# Patient Record
Sex: FEMALE | Race: WHITE | NOT HISPANIC OR LATINO | URBAN - METROPOLITAN AREA
[De-identification: names, ages, dates, MRNs, and addresses within clinical notes are randomized per-mention and may not be internally consistent; named-entity substitution may affect disease eponyms.]

---

## 2018-03-13 ENCOUNTER — APPOINTMENT (RX ONLY)
Dept: URBAN - METROPOLITAN AREA CLINIC 127 | Facility: CLINIC | Age: 83
Setting detail: DERMATOLOGY
End: 2018-03-13

## 2018-03-13 DIAGNOSIS — Z71.89 OTHER SPECIFIED COUNSELING: ICD-10-CM

## 2018-03-13 DIAGNOSIS — L82.1 OTHER SEBORRHEIC KERATOSIS: ICD-10-CM

## 2018-03-13 DIAGNOSIS — L81.4 OTHER MELANIN HYPERPIGMENTATION: ICD-10-CM

## 2018-03-13 DIAGNOSIS — L24.9 IRRITANT CONTACT DERMATITIS, UNSPECIFIED CAUSE: ICD-10-CM | Status: RESOLVED

## 2018-03-13 PROBLEM — J45.909 UNSPECIFIED ASTHMA, UNCOMPLICATED: Status: ACTIVE | Noted: 2018-03-13

## 2018-03-13 PROBLEM — Z85.828 PERSONAL HISTORY OF OTHER MALIGNANT NEOPLASM OF SKIN: Status: ACTIVE | Noted: 2018-03-13

## 2018-03-13 PROBLEM — J30.1 ALLERGIC RHINITIS DUE TO POLLEN: Status: ACTIVE | Noted: 2018-03-13

## 2018-03-13 PROBLEM — M12.9 ARTHROPATHY, UNSPECIFIED: Status: ACTIVE | Noted: 2018-03-13

## 2018-03-13 PROBLEM — K21.9 GASTRO-ESOPHAGEAL REFLUX DISEASE WITHOUT ESOPHAGITIS: Status: ACTIVE | Noted: 2018-03-13

## 2018-03-13 PROBLEM — F32.9 MAJOR DEPRESSIVE DISORDER, SINGLE EPISODE, UNSPECIFIED: Status: ACTIVE | Noted: 2018-03-13

## 2018-03-13 PROBLEM — L85.3 XEROSIS CUTIS: Status: ACTIVE | Noted: 2018-03-13

## 2018-03-13 PROBLEM — Z85.79 PERSONAL HISTORY OF OTHER MALIGNANT NEOPLASMS OF LYMPHOID, HEMATOPOIETIC AND RELATED TISSUES: Status: ACTIVE | Noted: 2018-03-13

## 2018-03-13 PROBLEM — H91.90 UNSPECIFIED HEARING LOSS, UNSPECIFIED EAR: Status: ACTIVE | Noted: 2018-03-13

## 2018-03-13 PROCEDURE — ? COUNSELING

## 2018-03-13 PROCEDURE — ? DIAGNOSIS COMMENT

## 2018-03-13 PROCEDURE — 99202 OFFICE O/P NEW SF 15 MIN: CPT

## 2018-03-13 ASSESSMENT — LOCATION DETAILED DESCRIPTION DERM
LOCATION DETAILED: LEFT INFERIOR CENTRAL MALAR CHEEK
LOCATION DETAILED: LEFT CENTRAL MALAR CHEEK

## 2018-03-13 ASSESSMENT — LOCATION ZONE DERM: LOCATION ZONE: FACE

## 2018-03-13 ASSESSMENT — LOCATION SIMPLE DESCRIPTION DERM: LOCATION SIMPLE: LEFT CHEEK

## 2018-03-13 NOTE — HPI: RASH
How Severe Is Your Rash?: moderate
Is This A New Presentation, Or A Follow-Up?: Rash
Additional History: rash occurred after starting Oxy 10% BP cream on face

## 2022-06-04 ENCOUNTER — TELEPHONE ENCOUNTER (OUTPATIENT)
Dept: URBAN - METROPOLITAN AREA CLINIC 68 | Facility: CLINIC | Age: 87
End: 2022-06-04

## 2022-06-04 RX ORDER — DICYCLOMINE HYDROCHLORIDE 20 MG/1
DICYCLOMINE HCL( 20MG ORAL   ) UNDEFINED -HX ENTRY TABLET ORAL
OUTPATIENT
Start: 2013-08-19

## 2022-06-04 RX ORDER — OXYCODONE 5 MG/1
OXYCODONE HCL( 5MG ORAL 5 MG THREE TIMES DAILY ) UNDEFINED -HX ENTRY TABLET ORAL
OUTPATIENT
Start: 2015-02-02

## 2022-06-04 RX ORDER — ESCITALOPRAM 10 MG/1
ESCITALOPRAM OXALATE( 10MG ORAL 10 MG  ) UNDEFINED -HX ENTRY TABLET, FILM COATED ORAL
OUTPATIENT
Start: 2015-09-15

## 2022-06-04 RX ORDER — MIDODRINE HYDROCHLORIDE 2.5 MG/1
MIDODRINE HCL( 2.5MG ORAL  TID ) INACTIVE -HX ENTRY TABLET ORAL TID
OUTPATIENT
Start: 2017-11-14

## 2022-06-05 ENCOUNTER — TELEPHONE ENCOUNTER (OUTPATIENT)
Dept: URBAN - METROPOLITAN AREA CLINIC 68 | Facility: CLINIC | Age: 87
End: 2022-06-05

## 2022-06-05 RX ORDER — ALBUTEROL SULFATE 90 UG/1
PROAIR HFA( 108 (90 BASE)MCG/ACT INHALATION  AS DIRECTED ) ACTIVE -HX ENTRY AEROSOL, METERED RESPIRATORY (INHALATION) AS DIRECTED
Status: ACTIVE | COMMUNITY
Start: 2017-12-08

## 2022-06-05 RX ORDER — OXYCODONE HYDROCHLORIDE 5 MG/1
OXYCODONE HCL( 5MG ORAL  AS DIRECTED ) ACTIVE -HX ENTRY CAPSULE ORAL AS DIRECTED
Status: ACTIVE | COMMUNITY
Start: 2017-12-08

## 2022-06-05 RX ORDER — FENTANYL 25 UG/H
FENTANYL( 25MCG/HR TRANSDERMAL  AS DIRECTED ) ACTIVE -HX ENTRY PATCH, EXTENDED RELEASE TRANSDERMAL AS DIRECTED
Status: ACTIVE | COMMUNITY
Start: 2017-12-08

## 2022-06-05 RX ORDER — OMEPRAZOLE 20 MG/1
OMEPRAZOLE( 20MG ORAL  DAILY ) ACTIVE -HX ENTRY TABLET, DELAYED RELEASE ORAL DAILY
Status: ACTIVE | COMMUNITY
Start: 2017-12-08

## 2022-06-05 RX ORDER — DICYCLOMINE HYDROCHLORIDE 20 MG/1
DICYCLOMINE HCL( 20MG ORAL  DAILY ) ACTIVE -HX ENTRY TABLET ORAL DAILY
Status: ACTIVE | COMMUNITY
Start: 2017-12-08

## 2022-06-05 RX ORDER — SAW/VIT E/SOD SEL/LYC/BETA/PYG 160-100
PROBIOTIC & ACIDOPHILUS EX ST(  ORAL  DAILY ) ACTIVE -HX ENTRY TABLET ORAL DAILY
Status: ACTIVE | COMMUNITY
Start: 2017-12-08

## 2022-06-25 ENCOUNTER — TELEPHONE ENCOUNTER (OUTPATIENT)
Age: 87
End: 2022-06-25

## 2022-06-25 RX ORDER — MIDODRINE HYDROCHLORIDE 2.5 MG/1
MIDODRINE HCL( 2.5MG ORAL  TID ) INACTIVE -HX ENTRY TABLET ORAL TID
OUTPATIENT
Start: 2017-11-14

## 2022-06-25 RX ORDER — OXYCODONE HYDROCHLORIDE 5 MG/1
OXYCODONE HCL( 5MG ORAL 5 MG THREE TIMES DAILY ) UNDEFINED -HX ENTRY TABLET ORAL
OUTPATIENT
Start: 2015-02-02

## 2022-06-25 RX ORDER — ESCITALOPRAM 10 MG/1
ESCITALOPRAM OXALATE( 10MG ORAL 10 MG  ) UNDEFINED -HX ENTRY TABLET, FILM COATED ORAL
OUTPATIENT
Start: 2015-09-15

## 2022-06-25 RX ORDER — DICYCLOMINE HYDROCHLORIDE 20 MG/1
DICYCLOMINE HCL( 20MG ORAL   ) UNDEFINED -HX ENTRY TABLET ORAL
OUTPATIENT
Start: 2013-08-19

## 2022-06-26 ENCOUNTER — TELEPHONE ENCOUNTER (OUTPATIENT)
Age: 87
End: 2022-06-26

## 2022-06-26 RX ORDER — DICYCLOMINE HYDROCHLORIDE 20 MG/1
DICYCLOMINE HCL( 20MG ORAL  DAILY ) ACTIVE -HX ENTRY TABLET ORAL DAILY
Status: ACTIVE | COMMUNITY
Start: 2017-12-08

## 2022-06-26 RX ORDER — OMEPRAZOLE 20 MG/1
OMEPRAZOLE( 20MG ORAL  DAILY ) ACTIVE -HX ENTRY TABLET, DELAYED RELEASE ORAL DAILY
Status: ACTIVE | COMMUNITY
Start: 2017-12-08

## 2022-06-26 RX ORDER — SAW/VIT E/SOD SEL/LYC/BETA/PYG 160-100
PROBIOTIC & ACIDOPHILUS EX ST(  ORAL  DAILY ) ACTIVE -HX ENTRY TABLET ORAL DAILY
Status: ACTIVE | COMMUNITY
Start: 2017-12-08

## 2022-06-26 RX ORDER — OXYCODONE HYDROCHLORIDE 5 MG/1
OXYCODONE HCL( 5MG ORAL  AS DIRECTED ) ACTIVE -HX ENTRY CAPSULE ORAL AS DIRECTED
Status: ACTIVE | COMMUNITY
Start: 2017-12-08

## 2022-06-26 RX ORDER — FENTANYL 25 UG/H
FENTANYL( 25MCG/HR TRANSDERMAL  AS DIRECTED ) ACTIVE -HX ENTRY PATCH, EXTENDED RELEASE TRANSDERMAL AS DIRECTED
Status: ACTIVE | COMMUNITY
Start: 2017-12-08

## 2023-06-02 ENCOUNTER — DASHBOARD ENCOUNTERS (OUTPATIENT)
Age: 88
End: 2023-06-02

## 2023-06-02 ENCOUNTER — OFFICE VISIT (OUTPATIENT)
Dept: URBAN - METROPOLITAN AREA CLINIC 68 | Facility: CLINIC | Age: 88
End: 2023-06-02

## 2023-06-02 PROBLEM — 54586004: Status: ACTIVE | Noted: 2023-06-02

## 2023-06-02 PROBLEM — 62315008: Status: ACTIVE | Noted: 2023-06-02

## 2023-06-02 RX ORDER — OXYCODONE HYDROCHLORIDE 5 MG/1
OXYCODONE HCL( 5MG ORAL  AS DIRECTED ) ACTIVE -HX ENTRY CAPSULE ORAL AS DIRECTED
Status: ACTIVE | COMMUNITY
Start: 2017-12-08

## 2023-06-02 RX ORDER — OMEPRAZOLE 20 MG/1
OMEPRAZOLE( 20MG ORAL  DAILY ) ACTIVE -HX ENTRY TABLET, DELAYED RELEASE ORAL DAILY
Status: ACTIVE | COMMUNITY
Start: 2017-12-08

## 2023-06-02 RX ORDER — DICYCLOMINE HYDROCHLORIDE 20 MG/1
DICYCLOMINE HCL( 20MG ORAL  DAILY ) ACTIVE -HX ENTRY TABLET ORAL DAILY
Status: ACTIVE | COMMUNITY
Start: 2017-12-08

## 2023-06-02 RX ORDER — SAW/VIT E/SOD SEL/LYC/BETA/PYG 160-100
PROBIOTIC & ACIDOPHILUS EX ST(  ORAL  DAILY ) ACTIVE -HX ENTRY TABLET ORAL DAILY
Status: ACTIVE | COMMUNITY
Start: 2017-12-08

## 2023-06-02 RX ORDER — FENTANYL 25 UG/H
FENTANYL( 25MCG/HR TRANSDERMAL  AS DIRECTED ) ACTIVE -HX ENTRY PATCH, EXTENDED RELEASE TRANSDERMAL AS DIRECTED
Status: ACTIVE | COMMUNITY
Start: 2017-12-08

## 2023-06-02 NOTE — HPI-MIGRATED HPI
General : Patient evaluated due to diarrhea and RLQ abdominal pain persistent for two months. She has noticed blood in the stool as well. She reports she has lost a few pounds over the last couple of months. She denies any nausea/vomiting, melena, fevers.

## 2023-06-24 LAB — CLOSTRIDIUM DIFFICILE: (no result)

## 2025-02-28 ENCOUNTER — OFFICE VISIT (OUTPATIENT)
Dept: URBAN - METROPOLITAN AREA CLINIC 68 | Facility: CLINIC | Age: 89
End: 2025-02-28
Payer: COMMERCIAL

## 2025-02-28 VITALS
HEIGHT: 67 IN | WEIGHT: 104 LBS | DIASTOLIC BLOOD PRESSURE: 64 MMHG | BODY MASS INDEX: 16.32 KG/M2 | SYSTOLIC BLOOD PRESSURE: 116 MMHG

## 2025-02-28 DIAGNOSIS — K58.0 IRRITABLE BOWEL SYNDROME WITH DIARRHEA: ICD-10-CM

## 2025-02-28 DIAGNOSIS — K52.9 CHRONIC DIARRHEA: ICD-10-CM

## 2025-02-28 PROBLEM — 197125005: Status: ACTIVE | Noted: 2025-02-28

## 2025-02-28 PROCEDURE — 99214 OFFICE O/P EST MOD 30 MIN: CPT | Performed by: INTERNAL MEDICINE

## 2025-02-28 RX ORDER — DICYCLOMINE HYDROCHLORIDE 20 MG/1
DICYCLOMINE HCL( 20MG ORAL  DAILY ) ACTIVE -HX ENTRY TABLET ORAL DAILY
Status: ON HOLD | COMMUNITY
Start: 2017-12-08

## 2025-02-28 RX ORDER — ATORVASTATIN CALCIUM 10 MG/1
TABLET, FILM COATED ORAL
Qty: 100 TABLET | Status: ACTIVE | COMMUNITY

## 2025-02-28 RX ORDER — OXYCODONE HYDROCHLORIDE 5 MG/1
OXYCODONE HCL( 5MG ORAL  AS DIRECTED ) ACTIVE -HX ENTRY CAPSULE ORAL AS DIRECTED
Status: ON HOLD | COMMUNITY
Start: 2017-12-08

## 2025-02-28 RX ORDER — MUPIROCIN 20 MG/G
OINTMENT TOPICAL
Qty: 30 GRAM | Status: ACTIVE | COMMUNITY

## 2025-02-28 RX ORDER — METOPROLOL TARTRATE 25 MG/1
TABLET, FILM COATED ORAL
Qty: 100 TABLET | Status: ACTIVE | COMMUNITY

## 2025-02-28 RX ORDER — FENTANYL 25 UG/H
FENTANYL( 25MCG/HR TRANSDERMAL  AS DIRECTED ) ACTIVE -HX ENTRY PATCH, EXTENDED RELEASE TRANSDERMAL AS DIRECTED
Status: ON HOLD | COMMUNITY
Start: 2017-12-08

## 2025-02-28 RX ORDER — CHLORDIAZEPOXIDE HYDROCHLORIDE AND CLIDINIUM BROMIDE 5; 2.5 MG/1; MG/1
CAPSULE ORAL
Qty: 60 CAPSULE | Status: ACTIVE | COMMUNITY

## 2025-02-28 RX ORDER — SAW/VIT E/SOD SEL/LYC/BETA/PYG 160-100
PROBIOTIC & ACIDOPHILUS EX ST(  ORAL  DAILY ) ACTIVE -HX ENTRY TABLET ORAL DAILY
Status: ACTIVE | COMMUNITY
Start: 2017-12-08

## 2025-02-28 RX ORDER — FAMOTIDINE 20 MG/1
TABLET, FILM COATED ORAL
Qty: 100 TABLET | Status: ACTIVE | COMMUNITY

## 2025-02-28 RX ORDER — ONDANSETRON 4 MG/1
TABLET, FILM COATED ORAL
Qty: 60 EACH | Refills: 0 | Status: ACTIVE | COMMUNITY

## 2025-02-28 RX ORDER — OMEPRAZOLE 20 MG/1
OMEPRAZOLE( 20MG ORAL  DAILY ) ACTIVE -HX ENTRY TABLET, DELAYED RELEASE ORAL DAILY
Status: ACTIVE | COMMUNITY
Start: 2017-12-08

## 2025-02-28 NOTE — HPI-TODAY'S VISIT:
Patient evaluated due to IBS.  Referred having intermittent episodes of diarrhea, chronically. Referred having intermittent episodes of lower abd cramps. Referred using PRN imodium. As per daughter symptoms are mostly when having a lot of stress.  Denies nausea, vomits, dysphagia, odynophagia, heartburn, diarrhea, constipation GI bleeding or weight loss

## 2025-03-24 ENCOUNTER — TELEPHONE ENCOUNTER (OUTPATIENT)
Dept: URBAN - METROPOLITAN AREA CLINIC 68 | Facility: CLINIC | Age: 89
End: 2025-03-24